# Patient Record
Sex: FEMALE | Race: WHITE | NOT HISPANIC OR LATINO | Employment: OTHER | URBAN - METROPOLITAN AREA
[De-identification: names, ages, dates, MRNs, and addresses within clinical notes are randomized per-mention and may not be internally consistent; named-entity substitution may affect disease eponyms.]

---

## 2017-12-15 ASSESSMENT — MIFFLIN-ST. JEOR: SCORE: 1237.2

## 2017-12-17 ENCOUNTER — ANESTHESIA - HEALTHEAST (OUTPATIENT)
Dept: SURGERY | Facility: HOSPITAL | Age: 57
End: 2017-12-17

## 2017-12-18 ENCOUNTER — SURGERY - HEALTHEAST (OUTPATIENT)
Dept: SURGERY | Facility: HOSPITAL | Age: 57
End: 2017-12-18

## 2017-12-18 ASSESSMENT — MIFFLIN-ST. JEOR: SCORE: 1281.2

## 2021-05-31 VITALS — HEIGHT: 65 IN | BODY MASS INDEX: 26.27 KG/M2 | WEIGHT: 157.7 LBS

## 2021-06-14 NOTE — ANESTHESIA CARE TRANSFER NOTE
Last vitals:   Vitals:    12/18/17 1330   BP: 124/77   Pulse: (!) 102   Resp: 12   Temp: 36.7  C (98  F)   SpO2: 100%     Patient's level of consciousness is drowsy  Spontaneous respirations: yes  Maintains airway independently: yes  Dentition unchanged: yes  Oropharynx: oropharynx clear of all foreign objects    QCDR Measures:  ASA# 20 - Surgical Safety Checklist: WHO surgical safety checklist completed prior to induction  PQRS# 430 - Adult PONV Prevention: 4558F - Pt received => 2 anti-emetic agents (different classes) preop & intraop  ASA# 8 - Peds PONV Prevention: NA - Not pediatric patient, not GA or 2 or more risk factors NOT present  PQRS# 424 - [place]-op Temp Management: 4559F - At least one body temp DOCUMENTED => 35.5C or 95.9F within required timeframe  PQRS# 426 - PACU Transfer Protocol: - Transfer of care checklist used  ASA# 14 - Acute Post-op Pain: ASA14B - Patient did NOT experience pain >= 7 out of 10

## 2021-06-14 NOTE — ANESTHESIA PREPROCEDURE EVALUATION
"Anesthesia Evaluation      Patient summary reviewed   No history of anesthetic complications     Airway   Mallampati: II  Neck ROM: full   Pulmonary - negative ROS and normal exam                          Cardiovascular - negative ROS and normal exam   Neuro/Psych      Comments: Migraine HA's    Endo/Other - negative ROS      GI/Hepatic/Renal - negative ROS   (-) GERD     Other findings: Hgb 12.9, Plts 193K        Dental - normal exam   (+) caps                       Anesthesia Plan  Planned anesthetic: general endotracheal  Scopolamine patch  Propofol 50 mcg/kg/min IV with Sevo.  BIS Monitor  Decadron 10 mg IV.  Zofran 4 mg IV.  Toradol 30 mg IV if \"ok\" with Dr. Gracia  ASA 1   Induction: intravenous   Anesthetic plan and risks discussed with: patient and spouse  Anesthesia plan special considerations: antiemetics,   Post-op plan: routine recovery          "

## 2021-06-14 NOTE — ANESTHESIA POSTPROCEDURE EVALUATION
Patient: Lety Bernal-Fred  LAPAROSCOPIC  ASSISTED VAGINAL HYSTERECTOMY, BILATERAL SALPINGO OOPHORECTOMY CYSTOSCOPY  Anesthesia type: general    Patient location: PACU  Last vitals:   Vitals:    12/18/17 1600   BP: 118/69   Pulse: 92   Resp: 16   Temp: 37.1  C (98.7  F)   SpO2: 98%     Post vital signs: stable  Level of consciousness: awake and responds to simple questions  Post-anesthesia pain: pain controlled  Post-anesthesia nausea and vomiting: no  Pulmonary: unassisted, return to baseline  Cardiovascular: stable and blood pressure at baseline  Hydration: adequate  Anesthetic events: no    QCDR Measures:  ASA# 11 - [place]-op Cardiac Arrest: ASA11B - Patient did NOT experience unanticipated cardiac arrest  ASA# 12 - [place]-op Mortality Rate: ASA12B - Patient did NOT die  ASA# 13 - PACU Re-Intubation Rate: ASA13B - Patient did NOT require a new airway mgmt  ASA# 10 - Composite Anes Safety: ASA10A - No serious adverse event    Additional Notes:

## 2023-09-11 ENCOUNTER — HOSPITAL ENCOUNTER (EMERGENCY)
Facility: CLINIC | Age: 63
Discharge: HOME | End: 2023-09-11
Attending: EMERGENCY MEDICINE | Admitting: EMERGENCY MEDICINE
Payer: COMMERCIAL

## 2023-09-11 ENCOUNTER — APPOINTMENT (OUTPATIENT)
Dept: ULTRASOUND IMAGING | Facility: CLINIC | Age: 63
End: 2023-09-11
Attending: EMERGENCY MEDICINE
Payer: COMMERCIAL

## 2023-09-11 ENCOUNTER — APPOINTMENT (OUTPATIENT)
Dept: CT IMAGING | Facility: CLINIC | Age: 63
End: 2023-09-11
Attending: EMERGENCY MEDICINE
Payer: COMMERCIAL

## 2023-09-11 ENCOUNTER — APPOINTMENT (OUTPATIENT)
Dept: GENERAL RADIOLOGY | Facility: CLINIC | Age: 63
End: 2023-09-11
Attending: EMERGENCY MEDICINE
Payer: COMMERCIAL

## 2023-09-11 VITALS
TEMPERATURE: 97.9 F | WEIGHT: 140 LBS | OXYGEN SATURATION: 98 % | SYSTOLIC BLOOD PRESSURE: 125 MMHG | RESPIRATION RATE: 9 BRPM | HEART RATE: 72 BPM | BODY MASS INDEX: 23.32 KG/M2 | DIASTOLIC BLOOD PRESSURE: 80 MMHG | HEIGHT: 65 IN

## 2023-09-11 DIAGNOSIS — K85.00 IDIOPATHIC ACUTE PANCREATITIS WITHOUT INFECTION OR NECROSIS: ICD-10-CM

## 2023-09-11 LAB
ALBUMIN UR-MCNC: NEGATIVE MG/DL
ANION GAP SERPL CALCULATED.3IONS-SCNC: 10 MMOL/L (ref 7–15)
APPEARANCE UR: CLEAR
ATRIAL RATE - MUSE: 76 BPM
BASOPHILS # BLD AUTO: 0 10E3/UL (ref 0–0.2)
BASOPHILS NFR BLD AUTO: 1 %
BILIRUB UR QL STRIP: NEGATIVE
BUN SERPL-MCNC: 12.7 MG/DL (ref 8–23)
CALCIUM SERPL-MCNC: 9.6 MG/DL (ref 8.8–10.2)
CHLORIDE SERPL-SCNC: 106 MMOL/L (ref 98–107)
COLOR UR AUTO: NORMAL
CREAT SERPL-MCNC: 0.71 MG/DL (ref 0.51–0.95)
DEPRECATED HCO3 PLAS-SCNC: 25 MMOL/L (ref 22–29)
DIASTOLIC BLOOD PRESSURE - MUSE: NORMAL MMHG
EGFRCR SERPLBLD CKD-EPI 2021: >90 ML/MIN/1.73M2
EOSINOPHIL # BLD AUTO: 0.1 10E3/UL (ref 0–0.7)
EOSINOPHIL NFR BLD AUTO: 1 %
ERYTHROCYTE [DISTWIDTH] IN BLOOD BY AUTOMATED COUNT: 12.3 % (ref 10–15)
GLUCOSE SERPL-MCNC: 93 MG/DL (ref 70–99)
GLUCOSE UR STRIP-MCNC: NEGATIVE MG/DL
HCT VFR BLD AUTO: 39.3 % (ref 35–47)
HGB BLD-MCNC: 13.3 G/DL (ref 11.7–15.7)
HGB UR QL STRIP: NEGATIVE
IMM GRANULOCYTES # BLD: 0 10E3/UL
IMM GRANULOCYTES NFR BLD: 0 %
INTERPRETATION ECG - MUSE: NORMAL
KETONES UR STRIP-MCNC: NEGATIVE MG/DL
LEUKOCYTE ESTERASE UR QL STRIP: NEGATIVE
LIPASE SERPL-CCNC: 375 U/L (ref 13–60)
LYMPHOCYTES # BLD AUTO: 2 10E3/UL (ref 0.8–5.3)
LYMPHOCYTES NFR BLD AUTO: 37 %
MCH RBC QN AUTO: 32.3 PG (ref 26.5–33)
MCHC RBC AUTO-ENTMCNC: 33.8 G/DL (ref 31.5–36.5)
MCV RBC AUTO: 95 FL (ref 78–100)
MONOCYTES # BLD AUTO: 0.5 10E3/UL (ref 0–1.3)
MONOCYTES NFR BLD AUTO: 9 %
NEUTROPHILS # BLD AUTO: 2.9 10E3/UL (ref 1.6–8.3)
NEUTROPHILS NFR BLD AUTO: 52 %
NITRATE UR QL: NEGATIVE
NRBC # BLD AUTO: 0 10E3/UL
NRBC BLD AUTO-RTO: 0 /100
P AXIS - MUSE: 28 DEGREES
PH UR STRIP: 6.5 [PH] (ref 5–7)
PLATELET # BLD AUTO: 207 10E3/UL (ref 150–450)
POTASSIUM SERPL-SCNC: 4.2 MMOL/L (ref 3.4–5.3)
PR INTERVAL - MUSE: 126 MS
QRS DURATION - MUSE: 92 MS
QT - MUSE: 372 MS
QTC - MUSE: 418 MS
R AXIS - MUSE: 32 DEGREES
RBC # BLD AUTO: 4.12 10E6/UL (ref 3.8–5.2)
SODIUM SERPL-SCNC: 141 MMOL/L (ref 136–145)
SP GR UR STRIP: 1.01 (ref 1–1.03)
SYSTOLIC BLOOD PRESSURE - MUSE: NORMAL MMHG
T AXIS - MUSE: 40 DEGREES
TROPONIN T SERPL HS-MCNC: <6 NG/L
UROBILINOGEN UR STRIP-MCNC: NORMAL MG/DL
VENTRICULAR RATE- MUSE: 76 BPM
WBC # BLD AUTO: 5.5 10E3/UL (ref 4–11)

## 2023-09-11 PROCEDURE — 96360 HYDRATION IV INFUSION INIT: CPT | Mod: 59

## 2023-09-11 PROCEDURE — 71046 X-RAY EXAM CHEST 2 VIEWS: CPT

## 2023-09-11 PROCEDURE — 250N000011 HC RX IP 250 OP 636: Performed by: EMERGENCY MEDICINE

## 2023-09-11 PROCEDURE — 85025 COMPLETE CBC W/AUTO DIFF WBC: CPT | Performed by: EMERGENCY MEDICINE

## 2023-09-11 PROCEDURE — 250N000009 HC RX 250: Performed by: EMERGENCY MEDICINE

## 2023-09-11 PROCEDURE — 99285 EMERGENCY DEPT VISIT HI MDM: CPT | Mod: 25

## 2023-09-11 PROCEDURE — 250N000013 HC RX MED GY IP 250 OP 250 PS 637: Performed by: EMERGENCY MEDICINE

## 2023-09-11 PROCEDURE — 74177 CT ABD & PELVIS W/CONTRAST: CPT

## 2023-09-11 PROCEDURE — 81003 URINALYSIS AUTO W/O SCOPE: CPT | Performed by: EMERGENCY MEDICINE

## 2023-09-11 PROCEDURE — 84484 ASSAY OF TROPONIN QUANT: CPT | Performed by: EMERGENCY MEDICINE

## 2023-09-11 PROCEDURE — 76705 ECHO EXAM OF ABDOMEN: CPT

## 2023-09-11 PROCEDURE — 36415 COLL VENOUS BLD VENIPUNCTURE: CPT | Performed by: EMERGENCY MEDICINE

## 2023-09-11 PROCEDURE — 96361 HYDRATE IV INFUSION ADD-ON: CPT

## 2023-09-11 PROCEDURE — 93005 ELECTROCARDIOGRAM TRACING: CPT

## 2023-09-11 PROCEDURE — 83690 ASSAY OF LIPASE: CPT | Performed by: EMERGENCY MEDICINE

## 2023-09-11 PROCEDURE — 80048 BASIC METABOLIC PNL TOTAL CA: CPT | Performed by: EMERGENCY MEDICINE

## 2023-09-11 PROCEDURE — 258N000003 HC RX IP 258 OP 636: Performed by: EMERGENCY MEDICINE

## 2023-09-11 RX ORDER — MORPHINE SULFATE 4 MG/ML
4 INJECTION, SOLUTION INTRAMUSCULAR; INTRAVENOUS ONCE
Status: DISCONTINUED | OUTPATIENT
Start: 2023-09-11 | End: 2023-09-11 | Stop reason: HOSPADM

## 2023-09-11 RX ORDER — ONDANSETRON 2 MG/ML
4 INJECTION INTRAMUSCULAR; INTRAVENOUS ONCE
Status: DISCONTINUED | OUTPATIENT
Start: 2023-09-11 | End: 2023-09-11 | Stop reason: HOSPADM

## 2023-09-11 RX ORDER — HYDROCODONE BITARTRATE AND ACETAMINOPHEN 5; 325 MG/1; MG/1
1 TABLET ORAL EVERY 6 HOURS PRN
Qty: 10 TABLET | Refills: 0 | Status: SHIPPED | OUTPATIENT
Start: 2023-09-11 | End: 2023-09-14

## 2023-09-11 RX ORDER — ONDANSETRON 4 MG/1
4 TABLET, ORALLY DISINTEGRATING ORAL EVERY 8 HOURS PRN
Qty: 10 TABLET | Refills: 0 | Status: SHIPPED | OUTPATIENT
Start: 2023-09-11 | End: 2023-09-14

## 2023-09-11 RX ORDER — IOPAMIDOL 755 MG/ML
71 INJECTION, SOLUTION INTRAVASCULAR ONCE
Status: COMPLETED | OUTPATIENT
Start: 2023-09-11 | End: 2023-09-11

## 2023-09-11 RX ORDER — ASPIRIN 81 MG/1
324 TABLET, CHEWABLE ORAL ONCE
Status: COMPLETED | OUTPATIENT
Start: 2023-09-11 | End: 2023-09-11

## 2023-09-11 RX ADMIN — ASPIRIN 81 MG 324 MG: 81 TABLET ORAL at 08:58

## 2023-09-11 RX ADMIN — IOPAMIDOL 71 ML: 755 INJECTION, SOLUTION INTRAVENOUS at 10:08

## 2023-09-11 RX ADMIN — SODIUM CHLORIDE 60 ML: 9 INJECTION, SOLUTION INTRAVENOUS at 10:08

## 2023-09-11 RX ADMIN — SODIUM CHLORIDE 1000 ML: 9 INJECTION, SOLUTION INTRAVENOUS at 10:19

## 2023-09-11 ASSESSMENT — ACTIVITIES OF DAILY LIVING (ADL)
ADLS_ACUITY_SCORE: 35

## 2023-09-11 NOTE — ED PROVIDER NOTES
History     Chief Complaint:  Chest Pain       HPI   Lety Virgen is a 62 year old female with a history of hyperlipidemia and GERD who presents emergency department with a complaint of chest pain.  Patient reports that her pain started 2.5 hours ago.  She states that she was awake and drinking her coffee when the pain started.  She states that it is pressure in quality.  Patient denies any shortness of breath, nausea, vomiting, abdominal pain, numbness or tingling in her extremities.  She denies any previous heart history.      Independent Historian:   None - Patient Only    Review of External Notes:   Reviewed the patient's GI note from 5/10/2023 has a history of acid reflux, Actos intolerance, bloating, dysphagia      Medications:    HYDROcodone-acetaminophen (NORCO) 5-325 MG tablet  ondansetron (ZOFRAN ODT) 4 MG ODT tab  aspirin 81 MG EC tablet  DOCOSAHEXANOIC ACID/EPA (FISH OIL ORAL)  ibuprofen (ADVIL,MOTRIN) 600 MG tablet  LACTOBACILLUS ACIDOPHILUS (ACIDOPHILUS ORAL)  magnesium oxide (MAG-OX) 400 mg tablet  MEDICATION CANNOT BE REORDERED - PLEASE MANUALLY REORDER AND DISCONTINUE THE OLD ORDER  melatonin 3 mg Tab tablet  multivitamin with minerals (THERA-M) 9 mg iron-400 mcg Tab tablet  oxyCODONE-acetaminophen (PERCOCET) 5-325 mg per tablet        Past Medical History:    No past medical history on file.    Past Surgical History:    Past Surgical History:   Procedure Laterality Date    ANTERIOR CRUCIATE LIGAMENT REPAIR Right     with autograft    DILATION AND CURETTAGE      X2    LAPAROSCOPIC ASSISTED HYSTERECTOMY VAGINAL N/A 12/18/2017    Procedure: LAPAROSCOPIC  ASSISTED VAGINAL HYSTERECTOMY, BILATERAL SALPINGO OOPHORECTOMY CYSTOSCOPY;  Surgeon: Tessie Gracia MD;  Location: [facility];  Service:         Physical Exam   Patient Vitals for the past 24 hrs:   BP Temp Temp src Pulse Resp SpO2 Height Weight   09/11/23 1300 125/80 -- -- 72 -- -- -- --   09/11/23 1130 -- -- -- 74 (!) 9 -- -- --  "  09/11/23 1000 128/82 -- -- 70 (!) 7 98 % -- --   09/11/23 0804 118/78 97.9  F (36.6  C) Oral 73 16 97 % 1.651 m (5' 5\") 63.5 kg (140 lb)        Physical Exam  General: Well-nourished, resting comfortably when I enter the room  Eyes: PERRL, conjunctivae pink no scleral icterus or conjunctival injection  ENT:  Moist mucus membranes  Respiratory:  Lungs clear to auscultation bilaterally, no crackles/rubs/wheezes.  Good air movement  CV: Normal rate and rhythm, no murmurs  GI:  Abdomen soft and non-distended.  No guarding or rebound.  Tenderness to palpation in the epigastrium and right upper quadrant.  Skin: Warm, dry.  No rashes or petechiae  Musculoskeletal: No peripheral edema or calf tenderness  Neuro: Alert and oriented to person/place/time  Psychiatric: Normal affect      Emergency Department Course     ECG results from 09/11/23   EKG 12-lead, tracing only     Value    Systolic Blood Pressure     Diastolic Blood Pressure     Ventricular Rate 76    Atrial Rate 76    CO Interval 126    QRS Duration 92        QTc 418    P Axis 28    R AXIS 32    T Axis 40    Interpretation ECG      Sinus rhythm  Incomplete right bundle branch block  Borderline ECG  No previous ECGs available  Confirmed by GENERATED REPORT, COMPUTER ([id number]),  Moon Zeng ([id number]) on 9/11/2023 8:40:31 AM           Imaging:  US Abdomen Limited   Final Result   IMPRESSION: Unremarkable gallbladder. No convincing gallstone. No   biliary duct dilatation.       MOHAN ABBOTT MD            SYSTEM ID:  [id number]      CT Abdomen Pelvis w Contrast   Final Result   IMPRESSION: Mild acute uncomplicated pancreatitis.      KEMAL SOTO MD            SYSTEM ID:  [id number]      XR Chest 2 Views   Final Result   IMPRESSION: There are no acute infiltrates. The cardiac silhouette is   not enlarged. Pulmonary vasculature is unremarkable.      KEMAL SOTO MD            SYSTEM ID:  [id number]         Report per radiology    Laboratory:  Labs Ordered " and Resulted from Time of ED Arrival to Time of ED Departure   LIPASE - Abnormal       Result Value    Lipase 375 (*)    BASIC METABOLIC PANEL - Normal    Sodium 141      Potassium 4.2      Chloride 106      Carbon Dioxide (CO2) 25      Anion Gap 10      Urea Nitrogen 12.7      Creatinine 0.71      Calcium 9.6      Glucose 93      GFR Estimate >90     TROPONIN T, HIGH SENSITIVITY - Normal    Troponin T, High Sensitivity <6     UA MACROSCOPIC WITH REFLEX TO MICRO AND CULTURE - Normal    Color Urine Light Yellow      Appearance Urine Clear      Glucose Urine Negative      Bilirubin Urine Negative      Ketones Urine Negative      Specific Gravity Urine 1.011      Blood Urine Negative      pH Urine 6.5      Protein Albumin Urine Negative      Urobilinogen Urine Normal      Nitrite Urine Negative      Leukocyte Esterase Urine Negative     CBC WITH PLATELETS AND DIFFERENTIAL    WBC Count 5.5      RBC Count 4.12      Hemoglobin 13.3      Hematocrit 39.3      MCV 95      MCH 32.3      MCHC 33.8      RDW 12.3      Platelet Count 207      % Neutrophils 52      % Lymphocytes 37      % Monocytes 9      % Eosinophils 1      % Basophils 1      % Immature Granulocytes 0      NRBCs per 100 WBC 0      Absolute Neutrophils 2.9      Absolute Lymphocytes 2.0      Absolute Monocytes 0.5      Absolute Eosinophils 0.1      Absolute Basophils 0.0      Absolute Immature Granulocytes 0.0      Absolute NRBCs 0.0          Procedures       Emergency Department Course & Assessments:             Interventions:  Medications   aspirin (ASA) chewable tablet 324 mg (324 mg Oral $Given 9/11/23 0858)   0.9% sodium chloride BOLUS (0 mLs Intravenous Stopped 9/11/23 1250)   iopamidol (ISOVUE-370) solution 71 mL (71 mLs Intravenous $Given 9/11/23 1008)   Saline Flush (60 mLs Intravenous $Given 9/11/23 1008)        Assessments:      Independent Interpretation (X-rays, CTs, rhythm strip):  Chest x-ray does not show any sign of consolidation, pneumothorax,  pleural effusion    Consultations/Discussion of Management or Tests:  None        Social Determinants of Health affecting care:   None    Disposition:  The patient was discharged to home.     Impression & Plan    CMS Diagnoses: None      Medical Decision Makin-year-old female with a history of GERD and hyperlipidemia presents to the emergency department with a complaint of chest pain.  Patient reports this pain started 2.5 hours ago.  She has never had pain like this before.  She states that her pain from acid reflux feels a little bit different.  She also reports when she got to the hospital she feels some pain in her left flank.  Patient denies any fevers, nausea, vomiting, shortness of breath, pain with urination.  On exam patient is not in any acute distress.  She does not have any CVA tenderness.    Differential includes but is not limited to MI, PE, pancreatitis, urinary tract infection.  Patient's labs show that she does have acute pancreatitis.  She has never had this before.  She states that she does see a GI doctor but has not had an ultrasound of her gallbladder and she is very concerned that she has gallstones.  She has been sipping on water in the room, and tolerating that well.  Patient does state that [place], and does not want to be admitted.  I do think that if she does want to go home we need to rule out some dangerous causes of pancreatitis.  We will get a ultrasound of her gallbladder as well as a CT of her abdomen to rule out pseudocyst.  Patient does report that she drinks at least 3 glasses of wine every night, this may be the cause of her pancreatitis.  On reevaluation the patient does have mild tenderness to palpation in the epigastrium and right upper quadrant.  Ultrasound of her gallbladder does not show any stones or duct dilation.  CT shows uncomplicated mild pancreatitis.  I will send the patient home with some Zofran and pain medicine.  I did give  her strict return precautions.  I advised her to only have a clear liquid diet for several days, and she can start reintroducing foods as tolerated.  I advised her to not drink anymore alcohol.  Otherwise her cardiac work-up is benign.  No signs of infection.  She will follow-up with her GI doctor outpatient.      Diagnosis:    ICD-10-CM    1. Idiopathic acute pancreatitis without infection or necrosis  K85.00            Discharge Medications:  Discharge Medication List as of 9/11/2023 12:54 PM        START taking these medications    Details   HYDROcodone-acetaminophen (NORCO) 5-325 MG tablet Take 1 tablet by mouth every 6 hours as needed for severe pain, Disp-10 tablet, R-0, E-Prescribe      ondansetron (ZOFRAN ODT) 4 MG ODT tab Take 1 tablet (4 mg) by mouth every 8 hours as needed for nausea, Disp-10 tablet, R-0, E-Prescribe                Grecia Hook MD  9/11/2023   Grecia Hook MD Richardson, Elizabeth, MD  09/11/23 1530

## 2023-09-11 NOTE — DISCHARGE INSTRUCTIONS
Please return to the emergency department if your symptoms increase, you experience uncontrollable pain or uncontrollable nausea or vomiting.    Please follow-up with your GI doctor and your primary care doctor once you return home.    You can use Zofran for nausea and vomiting.  You can use hydrocodone for pain.    Please only drink clear fluids as tolerated.  You can advance your diet once you are feeling better.